# Patient Record
Sex: FEMALE | Race: WHITE
[De-identification: names, ages, dates, MRNs, and addresses within clinical notes are randomized per-mention and may not be internally consistent; named-entity substitution may affect disease eponyms.]

---

## 2017-02-08 ENCOUNTER — HOSPITAL ENCOUNTER (OUTPATIENT)
Dept: HOSPITAL 17 - PLAB | Age: 52
End: 2017-02-08
Attending: FAMILY MEDICINE
Payer: MEDICARE

## 2017-02-08 DIAGNOSIS — F31.9: ICD-10-CM

## 2017-02-08 DIAGNOSIS — E03.9: Primary | ICD-10-CM

## 2017-02-08 DIAGNOSIS — R63.1: ICD-10-CM

## 2017-02-08 LAB
ALP SERPL-CCNC: 54 U/L (ref 45–117)
ALT SERPL-CCNC: 34 U/L (ref 10–53)
ANION GAP SERPL CALC-SCNC: 8 MEQ/L (ref 5–15)
AST SERPL-CCNC: 25 U/L (ref 15–37)
BASOPHILS # BLD AUTO: 0 TH/MM3 (ref 0–0.2)
BASOPHILS NFR BLD: 1.3 % (ref 0–2)
BILIRUB SERPL-MCNC: 0.3 MG/DL (ref 0.2–1)
BUN SERPL-MCNC: 5 MG/DL (ref 7–18)
CHLORIDE SERPL-SCNC: 95 MEQ/L (ref 98–107)
EOSINOPHIL # BLD: 0.1 TH/MM3 (ref 0–0.4)
EOSINOPHIL NFR BLD: 2.2 % (ref 0–4)
ERYTHROCYTE [DISTWIDTH] IN BLOOD BY AUTOMATED COUNT: 13.9 % (ref 11.6–17.2)
GFR SERPLBLD BASED ON 1.73 SQ M-ARVRAT: 92 ML/MIN (ref 89–?)
HCO3 BLD-SCNC: 27.8 MEQ/L (ref 21–32)
HCT VFR BLD CALC: 37.5 % (ref 35–46)
HDLC SERPL-MCNC: 54 MG/DL (ref 40–60)
HEMO FLAGS: (no result)
HEMOGLOBIN A1A: 0.9 %
HEMOGLOBIN A1B: 1.9 %
HEMOGLOBIN AO: 85.8 %
HEMOGLOBIN LA1C: 1.8 %
HEMOGLOBIN P3: 3.4 %
LDLC SERPL-MCNC: 93 MG/DL (ref 0–99)
LYMPHOCYTES # BLD AUTO: 1 TH/MM3 (ref 1–4.8)
LYMPHOCYTES NFR BLD AUTO: 27.4 % (ref 9–44)
MCH RBC QN AUTO: 29.4 PG (ref 27–34)
MCHC RBC AUTO-ENTMCNC: 34.2 % (ref 32–36)
MCV RBC AUTO: 86 FL (ref 80–100)
MONOCYTES NFR BLD: 9.9 % (ref 0–8)
NEUTROPHILS # BLD AUTO: 2.2 TH/MM3 (ref 1.8–7.7)
NEUTROPHILS NFR BLD AUTO: 59.2 % (ref 16–70)
PLATELET # BLD: 276 TH/MM3 (ref 150–450)
POTASSIUM SERPL-SCNC: 4.3 MEQ/L (ref 3.5–5.1)
RBC # BLD AUTO: 4.36 MIL/MM3 (ref 4–5.3)
SODIUM SERPL-SCNC: 131 MEQ/L (ref 136–145)
T4 FREE SERPL-MCNC: 1.17 NG/DL (ref 0.76–1.46)
WBC # BLD AUTO: 3.8 TH/MM3 (ref 4–11)

## 2017-02-08 PROCEDURE — 84443 ASSAY THYROID STIM HORMONE: CPT

## 2017-02-08 PROCEDURE — 36415 COLL VENOUS BLD VENIPUNCTURE: CPT

## 2017-02-08 PROCEDURE — 85025 COMPLETE CBC W/AUTO DIFF WBC: CPT

## 2017-02-08 PROCEDURE — 80053 COMPREHEN METABOLIC PANEL: CPT

## 2017-02-08 PROCEDURE — 80061 LIPID PANEL: CPT

## 2017-02-08 PROCEDURE — 83036 HEMOGLOBIN GLYCOSYLATED A1C: CPT

## 2017-02-08 PROCEDURE — 84439 ASSAY OF FREE THYROXINE: CPT

## 2017-10-03 ENCOUNTER — HOSPITAL ENCOUNTER (OUTPATIENT)
Dept: HOSPITAL 17 - PLAB | Age: 52
End: 2017-10-03
Attending: PSYCHIATRY & NEUROLOGY
Payer: MEDICARE

## 2017-10-03 DIAGNOSIS — F20.9: Primary | ICD-10-CM

## 2017-10-03 LAB
ALP SERPL-CCNC: 68 U/L (ref 45–117)
ALT SERPL-CCNC: 38 U/L (ref 10–53)
AST SERPL-CCNC: 21 U/L (ref 15–37)
BILIRUB INDIRECT SERPL-MCNC: 0.2 MG/DL (ref 0–0.8)
BILIRUB SERPL-MCNC: 0.3 MG/DL (ref 0.2–1)

## 2017-10-03 PROCEDURE — 84443 ASSAY THYROID STIM HORMONE: CPT

## 2017-10-03 PROCEDURE — 36415 COLL VENOUS BLD VENIPUNCTURE: CPT

## 2017-10-03 PROCEDURE — 80076 HEPATIC FUNCTION PANEL: CPT

## 2017-10-03 PROCEDURE — 80164 ASSAY DIPROPYLACETIC ACD TOT: CPT

## 2017-11-21 ENCOUNTER — HOSPITAL ENCOUNTER (EMERGENCY)
Dept: HOSPITAL 17 - PHED | Age: 52
Discharge: HOME | End: 2017-11-21
Payer: MEDICARE

## 2017-11-21 VITALS — HEIGHT: 67 IN | WEIGHT: 207.23 LBS | BODY MASS INDEX: 32.53 KG/M2

## 2017-11-21 VITALS
RESPIRATION RATE: 18 BRPM | OXYGEN SATURATION: 99 % | SYSTOLIC BLOOD PRESSURE: 153 MMHG | TEMPERATURE: 99.1 F | HEART RATE: 78 BPM | DIASTOLIC BLOOD PRESSURE: 102 MMHG
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
## 2017-11-21 DIAGNOSIS — T69.8XXA: Primary | ICD-10-CM

## 2017-11-21 PROCEDURE — 99283 EMERGENCY DEPT VISIT LOW MDM: CPT

## 2017-11-21 NOTE — PD
HPI


.


Chapped lips


Chief Complaint:  Skin Problem


Time Seen by Provider:  15:55


Travel History


International Travel<30 days:  No


Contact w/Intl Traveler<30days:  No


Traveled to known affect area:  No





History of Present Illness


HPI


51-year-old female patient presents emergency department for evaluation of 

chapped lips 2 weeks.  Patient states she got some cheap lip all of the store 

to help with her chapped lips and they're only getting worse.  Patient denies 

any fever, chills, chest pain, shortness of breath, nausea, vomiting, diarrhea.

  Patient states the pain associated with the chapped lips is worse when she 

eats spicy food.





PFSH


Past Medical History


Bipolar Disorder:  Yes


Diminished Hearing:  No


Psychiatric:  Yes (PT HAS EXTENSIVE PSYCH HX.)


Tetanus Vaccination:  Unknown


Influenza Vaccination:  No


Pregnant?:  Not Pregnant


Menopausal:  No





Social History


Alcohol Use:  No


Tobacco Use:  No


Substance Use:  No





Allergies-Medications


(Allergen,Severity, Reaction):  


Coded Allergies:  


     No Known Allergies (Verified , 4/16/10)


Reported Meds & Prescriptions





Reported Meds & Active Scripts


Active


Soy-O-Soothe (Lanolin (Topical)) 1 Cre Cre 1 Applic TOPICAL BID


Reported


Depakote ER (Divalproex Sodium) 250 Mg Dianelys 250 Mg PO TID


Thiothixene 10 Mg Cap 10 Mg PO AS DIRECTED


Clonazepam 1 Mg Tab 1 Mg PO HS


Levothyroxine (Levothyroxine Sodium) 75 Mcg Tab 75 Mcg PO DAILY








Review of Systems


Except as stated in HPI:  all other systems reviewed are Neg





Physical Exam


Narrative


GENERAL: Well-nourished, well-developed 51-year-old female patient in no acute 

distress.  Nontoxic appearing.


SKIN: Dry, flaky lips noted.  Focused skin assessment warm/dry.


HEAD: Normocephalic.  Atraumatic.


EYES: No scleral icterus. No injection or drainage. 


NECK: Supple, trachea midline. No JVD or lymphadenopathy.


CARDIOVASCULAR: Regular rate and rhythm without murmurs, gallops, or rubs. 


RESPIRATORY: Breath sounds equal bilaterally. No accessory muscle use.


GASTROINTESTINAL: Abdomen soft, non-tender, nondistended. 


MUSCULOSKELETAL: No cyanosis, or edema. 


BACK: Nontender without obvious deformity. No CVA tenderness.





Data


Data


Last Documented VS





Vital Signs








  Date Time  Temp Pulse Resp B/P (MAP) Pulse Ox O2 Delivery O2 Flow Rate FiO2


 


11/21/17 16:21        


 


11/21/17 14:13 99.1 78 18  99 Room Air  








Orders





 Orders


Ed Discharge Order (11/21/17 16:13)








MDM


Medical Decision Making


Medical Screen Exam Complete:  Yes


Emergency Medical Condition:  Yes


Differential Diagnosis


Differential diagnoses include but not limited to contact dermatitis, Chapped 

lips, dry skin


Narrative Course


51-year-old female presents to emergency room for evaluation of the Lips 2 

weeks.  Patient denies any fever, chills, chest pain, shortness breath, malaise

, nausea, vomiting, diarrhea.  Patient states the pain is worse when she is 

eating spicy food.  Patient given a prescription for lanolin to apply to the 

lips to promote healing.  Patient discharged home with instructions to follow-

up with her primary care but return to emergency Department with any emergent 

condition.





Diagnosis





 Primary Impression:  


 Chapped lips


Referrals:  


Primary Care Physician





***Additional Instructions:  


Please return to emergency department if your symptoms return or worsen. 


Follow up with your primary care provider. 


Apply lanolin to chapped lips twice a day.


Avoid spicy or hot foods and develops heal.


***Med/Other Pt SpecificInfo:  Prescription(s) given


Scripts


Lanolin (Topical) (Soy-O-Soothe) 1 Cre Cre


1 APPLIC TOPICAL BID, #1


   Prov: Courtney Schwab         11/21/17


Disposition:  01 DISCHARGE HOME


Condition:  Stable











Courtney Schwab Nov 21, 2017 16:13

## 2018-01-10 ENCOUNTER — HOSPITAL ENCOUNTER (OUTPATIENT)
Dept: HOSPITAL 17 - PLAB | Age: 53
End: 2018-01-10
Attending: PSYCHIATRY & NEUROLOGY
Payer: MEDICARE

## 2018-01-10 DIAGNOSIS — E87.1: ICD-10-CM

## 2018-01-10 DIAGNOSIS — E03.9: Primary | ICD-10-CM

## 2018-01-10 DIAGNOSIS — R63.1: ICD-10-CM

## 2018-01-10 DIAGNOSIS — F31.30: ICD-10-CM

## 2018-01-10 LAB
ALBUMIN SERPL-MCNC: 4.3 GM/DL (ref 3.4–5)
ALP SERPL-CCNC: 62 U/L (ref 45–117)
ALT SERPL-CCNC: 34 U/L (ref 10–53)
AST SERPL-CCNC: 21 U/L (ref 15–37)
BASOPHILS # BLD AUTO: 0.1 TH/MM3 (ref 0–0.2)
BASOPHILS NFR BLD: 1.8 % (ref 0–2)
BILIRUB SERPL-MCNC: 0.3 MG/DL (ref 0.2–1)
BUN SERPL-MCNC: 7 MG/DL (ref 7–18)
CALCIUM SERPL-MCNC: 9.1 MG/DL (ref 8.5–10.1)
CHLORIDE SERPL-SCNC: 94 MEQ/L (ref 98–107)
CHOLEST SERPL-MCNC: 186 MG/DL (ref 120–200)
CHOLESTEROL/ HDL RATIO: 3.12 RATIO
CREAT SERPL-MCNC: 0.7 MG/DL (ref 0.5–1)
EOSINOPHIL # BLD: 0 TH/MM3 (ref 0–0.4)
EOSINOPHIL NFR BLD: 1 % (ref 0–4)
ERYTHROCYTE [DISTWIDTH] IN BLOOD BY AUTOMATED COUNT: 14 % (ref 11.6–17.2)
GFR SERPLBLD BASED ON 1.73 SQ M-ARVRAT: 88 ML/MIN (ref 89–?)
HBA1C MFR BLD: 5.5 % (ref 4.3–6)
HCO3 BLD-SCNC: 26 MEQ/L (ref 21–32)
HCT VFR BLD CALC: 39 % (ref 35–46)
HDLC SERPL-MCNC: 59.6 MG/DL (ref 40–60)
HGB BLD-MCNC: 13.3 GM/DL (ref 11.6–15.3)
LDLC SERPL-MCNC: 111 MG/DL (ref 0–99)
LYMPHOCYTES # BLD AUTO: 0.9 TH/MM3 (ref 1–4.8)
LYMPHOCYTES NFR BLD AUTO: 24.5 % (ref 9–44)
MCH RBC QN AUTO: 30.3 PG (ref 27–34)
MCHC RBC AUTO-ENTMCNC: 34.2 % (ref 32–36)
MCV RBC AUTO: 88.5 FL (ref 80–100)
MONOCYTE #: 0.4 TH/MM3 (ref 0–0.9)
MONOCYTES NFR BLD: 9.2 % (ref 0–8)
NEUTROPHILS # BLD AUTO: 2.5 TH/MM3 (ref 1.8–7.7)
NEUTROPHILS NFR BLD AUTO: 63.5 % (ref 16–70)
PLATELET # BLD: 313 TH/MM3 (ref 150–450)
PMV BLD AUTO: 9.1 FL (ref 7–11)
PROT SERPL-MCNC: 8.6 GM/DL (ref 6.4–8.2)
RBC # BLD AUTO: 4.4 MIL/MM3 (ref 4–5.3)
SODIUM SERPL-SCNC: 130 MEQ/L (ref 136–145)
T4 FREE SERPL-MCNC: 1.01 NG/DL (ref 0.76–1.46)
TRIGL SERPL-MCNC: 75 MG/DL (ref 42–150)
WBC # BLD AUTO: 3.9 TH/MM3 (ref 4–11)

## 2018-01-10 PROCEDURE — 36415 COLL VENOUS BLD VENIPUNCTURE: CPT

## 2018-01-10 PROCEDURE — 84443 ASSAY THYROID STIM HORMONE: CPT

## 2018-01-10 PROCEDURE — 84439 ASSAY OF FREE THYROXINE: CPT

## 2018-01-10 PROCEDURE — 80053 COMPREHEN METABOLIC PANEL: CPT

## 2018-01-10 PROCEDURE — 80061 LIPID PANEL: CPT

## 2018-01-10 PROCEDURE — 85025 COMPLETE CBC W/AUTO DIFF WBC: CPT

## 2018-01-10 PROCEDURE — 83036 HEMOGLOBIN GLYCOSYLATED A1C: CPT
